# Patient Record
Sex: FEMALE | Employment: OTHER | URBAN - METROPOLITAN AREA
[De-identification: names, ages, dates, MRNs, and addresses within clinical notes are randomized per-mention and may not be internally consistent; named-entity substitution may affect disease eponyms.]

---

## 2019-02-20 ENCOUNTER — OFFICE VISIT (OUTPATIENT)
Dept: UROLOGY | Facility: HOSPITAL | Age: 74
End: 2019-02-20
Attending: OBSTETRICS & GYNECOLOGY
Payer: MEDICARE

## 2019-02-20 VITALS
SYSTOLIC BLOOD PRESSURE: 158 MMHG | BODY MASS INDEX: 23.92 KG/M2 | WEIGHT: 130 LBS | DIASTOLIC BLOOD PRESSURE: 62 MMHG | HEIGHT: 62 IN

## 2019-02-20 DIAGNOSIS — N81.89 PELVIC FLOOR WEAKNESS: ICD-10-CM

## 2019-02-20 DIAGNOSIS — R39.15 URINARY URGENCY: ICD-10-CM

## 2019-02-20 DIAGNOSIS — N95.2 VAGINAL ATROPHY: ICD-10-CM

## 2019-02-20 DIAGNOSIS — R35.0 URINARY FREQUENCY: Primary | ICD-10-CM

## 2019-02-20 LAB
CONTROL RUN WITHIN 24 HOURS?: YES
LEUKOCYTE ESTERASE URINE: NEGATIVE
NITRITE URINE: NEGATIVE

## 2019-02-20 PROCEDURE — 87086 URINE CULTURE/COLONY COUNT: CPT | Performed by: OBSTETRICS & GYNECOLOGY

## 2019-02-20 PROCEDURE — 99201 HC OUTPT EVAL AND MGNT NEW PT LEVEL 1: CPT

## 2019-02-20 RX ORDER — MULTIVITAMIN
1 CAPSULE ORAL
COMMUNITY

## 2019-02-20 RX ORDER — SODIUM FLUORIDE 5 MG/G
GEL, DENTIFRICE DENTAL
Refills: 0 | COMMUNITY
Start: 2019-02-09

## 2019-02-20 RX ORDER — SODIUM FLUORIDE 5 MG/G
GEL, DENTIFRICE DENTAL
COMMUNITY
Start: 2016-12-23

## 2019-02-20 RX ORDER — ESTRADIOL 0.1 MG/G
CREAM VAGINAL
Qty: 2 TUBE | Refills: 3 | Status: SHIPPED | OUTPATIENT
Start: 2019-02-20

## 2019-02-20 RX ORDER — LOVASTATIN 40 MG/1
40 TABLET ORAL NIGHTLY
COMMUNITY

## 2019-02-20 NOTE — PROGRESS NOTES
ID: Jeannie Tate  : 1945  Date: 2019     Referred by Dr. Tang Terrazas MD    Patient presents with:  Urinary Frequency  Urinary Urgency      HPI:  The patient is a 72year-old female,  (vaginal deliveries), who presents for evaluation of Disp:  Rfl: 0     No facility-administered medications prior to visit. Review of Systems:    A comprehensive 12 point review of systems was completed. Pertinent positives noted in the the HPI.   No CP  No SOB    Vitals:  /62   Ht 62\"   Wt 130 lb ROUTINE    (N95.2) Vaginal atrophy    (N81.89) Pelvic floor weakness      Discussion Items:   Behavioral and pharmacologic treatments for OAB  Topical estrogen therapy for treating UGA    Diagnostic Items:  Urine C&S    Medications Discussed:  Estrace Crea

## 2019-02-22 ENCOUNTER — TELEPHONE (OUTPATIENT)
Dept: UROLOGY | Facility: HOSPITAL | Age: 74
End: 2019-02-22

## 2019-02-22 NOTE — TELEPHONE ENCOUNTER
.Phoned patient, Krystina López,  and informed of normal urine culture results.  Patient to call if questions/concerns